# Patient Record
Sex: MALE | URBAN - NONMETROPOLITAN AREA
[De-identification: names, ages, dates, MRNs, and addresses within clinical notes are randomized per-mention and may not be internally consistent; named-entity substitution may affect disease eponyms.]

---

## 2021-10-05 ENCOUNTER — HOSPITAL ENCOUNTER (OUTPATIENT)
Age: 66
Setting detail: SPECIMEN
Discharge: HOME | End: 2021-10-05

## 2021-10-05 DIAGNOSIS — J96.00 ACUTE RESPIRATORY FAILURE, UNSPECIFIED WHETHER WITH HYPOXIA OR HYPERCAPNIA (HCC): ICD-10-CM

## 2021-10-05 LAB
ABSOLUTE EOS #: 0.14 K/UL (ref 0–0.44)
ABSOLUTE IMMATURE GRANULOCYTE: 0.04 K/UL (ref 0–0.3)
ABSOLUTE LYMPH #: 0.83 K/UL (ref 1.1–3.7)
ABSOLUTE MONO #: 0.29 K/UL (ref 0.1–1.2)
ALBUMIN SERPL-MCNC: 2.9 G/DL (ref 3.5–5.2)
ALBUMIN/GLOBULIN RATIO: 0.9 (ref 1–2.5)
ALP BLD-CCNC: 166 U/L (ref 40–129)
ALT SERPL-CCNC: <5 U/L (ref 5–41)
ANION GAP SERPL CALCULATED.3IONS-SCNC: 11 MMOL/L (ref 9–17)
AST SERPL-CCNC: 12 U/L
BASOPHILS # BLD: 0 % (ref 0–2)
BASOPHILS ABSOLUTE: 0 K/UL (ref 0–0.2)
BILIRUB SERPL-MCNC: 0.81 MG/DL (ref 0.3–1.2)
BUN BLDV-MCNC: 18 MG/DL (ref 8–23)
BUN/CREAT BLD: 19 (ref 9–20)
CALCIUM SERPL-MCNC: 8.4 MG/DL (ref 8.6–10.4)
CHLORIDE BLD-SCNC: 101 MMOL/L (ref 98–107)
CO2: 26 MMOL/L (ref 20–31)
CREAT SERPL-MCNC: 0.93 MG/DL (ref 0.7–1.2)
DIFFERENTIAL TYPE: ABNORMAL
EOSINOPHILS RELATIVE PERCENT: 4 % (ref 1–4)
GFR AFRICAN AMERICAN: >60 ML/MIN
GFR NON-AFRICAN AMERICAN: >60 ML/MIN
GFR SERPL CREATININE-BSD FRML MDRD: ABNORMAL ML/MIN/{1.73_M2}
GFR SERPL CREATININE-BSD FRML MDRD: ABNORMAL ML/MIN/{1.73_M2}
GLUCOSE BLD-MCNC: 81 MG/DL (ref 70–99)
HCT VFR BLD CALC: 27.8 % (ref 40.7–50.3)
HEMOGLOBIN: 7.9 G/DL (ref 13–17)
IMMATURE GRANULOCYTES: 1 %
LYMPHOCYTES # BLD: 23 % (ref 24–43)
MAGNESIUM: 1.6 MG/DL (ref 1.6–2.6)
MCH RBC QN AUTO: 25.3 PG (ref 25.2–33.5)
MCHC RBC AUTO-ENTMCNC: 28.4 G/DL (ref 28.4–34.8)
MCV RBC AUTO: 89.1 FL (ref 82.6–102.9)
MONOCYTES # BLD: 8 % (ref 3–12)
MORPHOLOGY: ABNORMAL
MORPHOLOGY: ABNORMAL
NRBC AUTOMATED: 0 PER 100 WBC
PDW BLD-RTO: 24.4 % (ref 11.8–14.4)
PLATELET # BLD: 199 K/UL (ref 138–453)
PLATELET ESTIMATE: ABNORMAL
PMV BLD AUTO: 10.5 FL (ref 8.1–13.5)
POTASSIUM SERPL-SCNC: 4.5 MMOL/L (ref 3.7–5.3)
RBC # BLD: 3.12 M/UL (ref 4.21–5.77)
RBC # BLD: ABNORMAL 10*6/UL
SEG NEUTROPHILS: 64 % (ref 36–65)
SEGMENTED NEUTROPHILS ABSOLUTE COUNT: 2.3 K/UL (ref 1.5–8.1)
SODIUM BLD-SCNC: 138 MMOL/L (ref 135–144)
TOTAL PROTEIN: 6.1 G/DL (ref 6.4–8.3)
WBC # BLD: 3.6 K/UL (ref 3.5–11.3)
WBC # BLD: ABNORMAL 10*3/UL

## 2021-10-05 PROCEDURE — 36415 COLL VENOUS BLD VENIPUNCTURE: CPT

## 2021-10-05 PROCEDURE — P9603 ONE-WAY ALLOW PRORATED MILES: HCPCS

## 2021-10-05 PROCEDURE — 85025 COMPLETE CBC W/AUTO DIFF WBC: CPT

## 2021-10-05 PROCEDURE — 80053 COMPREHEN METABOLIC PANEL: CPT

## 2021-10-05 PROCEDURE — 83735 ASSAY OF MAGNESIUM: CPT

## 2021-10-06 PROCEDURE — 99306 1ST NF CARE HIGH MDM 50: CPT | Performed by: FAMILY MEDICINE

## 2021-10-07 ENCOUNTER — OUTSIDE SERVICES (OUTPATIENT)
Dept: PRIMARY CARE CLINIC | Age: 66
End: 2021-10-07
Payer: MEDICARE

## 2021-10-07 DIAGNOSIS — J96.21 ACUTE ON CHRONIC RESPIRATORY FAILURE WITH HYPOXIA (HCC): Primary | ICD-10-CM

## 2021-10-07 DIAGNOSIS — I25.10 CORONARY ARTERY DISEASE INVOLVING NATIVE CORONARY ARTERY OF NATIVE HEART WITHOUT ANGINA PECTORIS: ICD-10-CM

## 2021-10-07 DIAGNOSIS — S81.802S OPEN WOUND OF LEFT LOWER LEG, SEQUELA: ICD-10-CM

## 2021-10-07 DIAGNOSIS — I73.9 PERIPHERAL VASCULAR DISEASE (HCC): ICD-10-CM

## 2021-10-07 PROBLEM — J96.00 ACUTE RESPIRATORY FAILURE (HCC): Status: ACTIVE | Noted: 2021-10-07

## 2021-10-07 RX ORDER — MAGNESIUM OXIDE 400 MG/1
400 TABLET ORAL DAILY
COMMUNITY
End: 2021-10-28 | Stop reason: SDUPTHER

## 2021-10-07 RX ORDER — FLUTICASONE PROPIONATE 50 MCG
1 SPRAY, SUSPENSION (ML) NASAL DAILY
COMMUNITY
End: 2021-10-28 | Stop reason: SDUPTHER

## 2021-10-07 RX ORDER — BUPROPION HYDROCHLORIDE 100 MG/1
100 TABLET ORAL DAILY
COMMUNITY
End: 2021-10-28 | Stop reason: SDUPTHER

## 2021-10-07 RX ORDER — ALBUTEROL SULFATE 1.25 MG/3ML
1 SOLUTION RESPIRATORY (INHALATION) EVERY 6 HOURS PRN
COMMUNITY
End: 2021-10-28 | Stop reason: SDUPTHER

## 2021-10-07 RX ORDER — METOPROLOL TARTRATE 50 MG/1
50 TABLET, FILM COATED ORAL DAILY
COMMUNITY
End: 2021-10-28 | Stop reason: SDUPTHER

## 2021-10-07 RX ORDER — QUETIAPINE FUMARATE 50 MG/1
50 TABLET, EXTENDED RELEASE ORAL NIGHTLY
COMMUNITY

## 2021-10-07 RX ORDER — FINASTERIDE 5 MG/1
5 TABLET, FILM COATED ORAL DAILY
COMMUNITY
End: 2021-10-28 | Stop reason: SDUPTHER

## 2021-10-07 RX ORDER — ACETAMINOPHEN 500 MG
500 TABLET ORAL EVERY 6 HOURS PRN
COMMUNITY

## 2021-10-07 RX ORDER — FUROSEMIDE 20 MG/1
20 TABLET ORAL DAILY
COMMUNITY
End: 2021-10-28 | Stop reason: SDUPTHER

## 2021-10-07 RX ORDER — NITROGLYCERIN 0.4 MG/1
0.4 TABLET SUBLINGUAL EVERY 5 MIN PRN
COMMUNITY
End: 2021-10-28 | Stop reason: SDUPTHER

## 2021-10-07 RX ORDER — WARFARIN SODIUM 2.5 MG/1
2.5 TABLET ORAL DAILY
COMMUNITY
End: 2021-10-28 | Stop reason: SDUPTHER

## 2021-10-07 RX ORDER — ALBUTEROL SULFATE 90 UG/1
2 AEROSOL, METERED RESPIRATORY (INHALATION) EVERY 4 HOURS PRN
COMMUNITY
End: 2021-10-28 | Stop reason: SDUPTHER

## 2021-10-07 RX ORDER — AMPICILLIN TRIHYDRATE 250 MG
1000 CAPSULE ORAL 2 TIMES DAILY
COMMUNITY

## 2021-10-07 RX ORDER — CLOPIDOGREL BISULFATE 75 MG/1
75 TABLET ORAL DAILY
COMMUNITY
End: 2021-10-28 | Stop reason: SDUPTHER

## 2021-10-07 RX ORDER — FERROUS SULFATE 325(65) MG
325 TABLET ORAL
COMMUNITY
End: 2021-10-28 | Stop reason: SDUPTHER

## 2021-10-07 RX ORDER — ROSUVASTATIN CALCIUM 5 MG/1
5 TABLET, COATED ORAL DAILY
COMMUNITY
End: 2021-10-28 | Stop reason: SDUPTHER

## 2021-10-07 RX ORDER — FLUTICASONE FUROATE, UMECLIDINIUM BROMIDE AND VILANTEROL TRIFENATATE 100; 62.5; 25 UG/1; UG/1; UG/1
1 POWDER RESPIRATORY (INHALATION) DAILY
COMMUNITY
End: 2021-10-28 | Stop reason: SDUPTHER

## 2021-10-07 RX ORDER — OXYCODONE AND ACETAMINOPHEN 10; 325 MG/1; MG/1
1 TABLET ORAL EVERY 4 HOURS PRN
COMMUNITY
End: 2021-10-28 | Stop reason: SDUPTHER

## 2021-10-07 RX ORDER — FOLIC ACID 1 MG/1
1 TABLET ORAL DAILY
COMMUNITY
End: 2021-10-28 | Stop reason: SDUPTHER

## 2021-10-07 RX ORDER — FAMOTIDINE 40 MG/1
40 TABLET, FILM COATED ORAL DAILY
COMMUNITY
End: 2021-10-28 | Stop reason: SDUPTHER

## 2021-10-07 RX ORDER — ALPRAZOLAM 0.5 MG/1
0.5 TABLET ORAL EVERY 8 HOURS PRN
COMMUNITY
End: 2021-10-28 | Stop reason: SDUPTHER

## 2021-10-07 NOTE — PROGRESS NOTES
Patient:  Isra Davis, 1955  I saw this patient on 10/06/2021, at [facility]. He had complicated hospital course, admitted for acute respiratory failure,had acute vascular insufficiency lt leg with compartmental syndrome and requires fasciotomy. Has severe PVD. He has lt leg pain and was discharged with wound vac but does not like it. Reason for Visit:      ICD-10-CM    1. Acute on chronic respiratory failure with hypoxia ([facility])  J96.21    2. Peripheral vascular disease (HCC)  I73.9    3. Open wound of left lower leg, sequela  S81.802S    4. Coronary artery disease involving native coronary artery of native heart without angina pectoris  I25.10        Changes since admissinoneon:    has lt leg pain  1. Fall:  none  2. Behavioral Change: does not like wound vac- states it makes his leg numb  3. Pain Control: has lt leg pain ,on pain meds  4. Mobility: decreased  5. Pressure Sore:  none  Current Outpatient Medications   Medication Sig Dispense Refill    acetaminophen (TYLENOL) 500 MG tablet Take 500 mg by mouth every 6 hours as needed for Pain      albuterol sulfate HFA (VENTOLIN HFA) 108 (90 Base) MCG/ACT inhaler Inhale 2 puffs into the lungs every 4 hours as needed for Wheezing      albuterol (ACCUNEB) 1.25 MG/3ML nebulizer solution Inhale 1 ampule into the lungs every 6 hours as needed for Wheezing      ALPRAZolam (XANAX) 0.5 MG tablet Take 0.5 mg by mouth every 8 hours as needed for Sleep.       buPROPion (WELLBUTRIN) 100 MG tablet Take 100 mg by mouth daily      Cinnamon 500 MG CAPS Take 1,000 mg by mouth 2 times daily      clopidogrel (PLAVIX) 75 MG tablet Take 75 mg by mouth daily      famotidine (PEPCID) 40 MG tablet Take 40 mg by mouth daily      ferrous sulfate (IRON 325) 325 (65 Fe) MG tablet Take 325 mg by mouth daily (with breakfast)      finasteride (PROSCAR) 5 MG tablet Take 5 mg by mouth daily      fluticasone (FLONASE) 50 MCG/ACT nasal spray 1 spray by Each Nostril route daily      folic acid (FOLVITE) 1 MG tablet Take 1 mg by mouth daily      furosemide (LASIX) 20 MG tablet Take 20 mg by mouth daily      magnesium oxide (MAG-OX) 400 MG tablet Take 400 mg by mouth daily      metoprolol tartrate (LOPRESSOR) 50 MG tablet Take 50 mg by mouth daily      nitroGLYCERIN (NITROSTAT) 0.4 MG SL tablet Place 0.4 mg under the tongue every 5 minutes as needed for Chest pain up to max of 3 total doses. If no relief after 1 dose, call 911.  oxyCODONE-acetaminophen (PERCOCET)  MG per tablet Take 1 tablet by mouth every 4 hours as needed for Pain.  QUEtiapine (SEROQUEL XR) 50 MG extended release tablet Take 50 mg by mouth nightly      rosuvastatin (CRESTOR) 5 MG tablet Take 5 mg by mouth daily      fluticasone-umeclidin-vilant (TRELEGY ELLIPTA) 100-62.5-25 MCG/INH AEPB Inhale 1 puff into the lungs daily      warfarin (COUMADIN) 4 MG tablet Take 4 mg by mouth daily       No current facility-administered medications for this visit.       Allergies:  Heparin and Hydralazine    Past Medical History:    Past Medical History:   Diagnosis Date    Acute respiratory failure ([facility] [address] 10/7/2021    Atherosclerosis of aorta (HCC)     Benign prostatic hyperplasia without lower urinary tract symptoms     Coronary artery disease involving native heart     Elevated prostate specific antigen (PSA)     GERD (gastroesophageal reflux disease)     Obstructive sleep apnea     Personal history of urinary calculi     Status post angioplasty with stent        Past Surgical History:    Past Surgical History:   Procedure Laterality Date    FASCIOTOMY      HX VASCULAR ANGIOPLASTY         Social History:   Social History     Tobacco Use    Smoking status: Current Every Day Smoker    Smokeless tobacco: Never Used   Substance Use Topics    Alcohol use: Not on file       Family History:   Family History   Problem Relation Age of Onset    Coronary Art Dis Mother            Review of Systems:  Constitutional: negative for fevers or chills  Eyes: negative for visual disturbance   ENT: negative for sore throat or nasal congestion,neg for  dysphagia  Respiratory:neg on oxygen by nasal cannula, neg for shortness of breath , cough  Cardiovascular: neg for chest pain , palpitations,pnd,neg for leg edema  Gastrointestinal: neg for abd pain, nausea, vomiting, diarrhea or constipation,no arelis,no blood in stool  Genitourinary: negative for dysuria, urgency,hematuria or frequency  Integument/breast: negative for skin rash or lesions  Neurological: negative for unilateral weakness, numbness or tingling. Muscular Skeletal: has lt leg pain from open wounds   Psych - anxious about leg pain    Objective:    Vitals:   BP-140/70,Pulse-68,Respi-18,Temp-97.6 WT-236lbs  -----------------------------------------------------------------  Exam:  GEN:   A & O x3, no apparent distress  EYES: No gross abnormalities. ENT:ENT exam normal, no neck nodes or sinus tenderness  NECK: normal, supple, no lymphadenopathy,  no carotid bruits  PULM: decreased breath sounds noted- bilat,no wheezing,on oxygen by nasal cannula  COR: regular rate & rhythm and no gallops  ABD:  soft, non-tender, non-distended, normal bowel sounds, no masses or organomegaly  : no cva or flank tenderness  EXT: Has open wounds lt leg,poor pulsations,no calf tenderness. NEURO: Motor and sensory grossly intact  PSYCH: anxious  SKIN:  No skin lesions or rashes    -----------------------------------------------------------------  Diagnostic Data:   · All diagnostic data was reviewed    Assessment:        ICD-10-CM    1. Acute on chronic respiratory failure with hypoxia (HCC)  J96.21    2. Peripheral vascular disease (HCC)  I73.9    3. Open wound of left lower leg, sequela  S81.802S    4.  Coronary artery disease involving native coronary artery of native heart without angina pectoris  I25.10      Patient Active Problem List   Diagnosis Code    Benign

## 2021-10-11 ENCOUNTER — OUTSIDE SERVICES (OUTPATIENT)
Dept: PRIMARY CARE CLINIC | Age: 66
End: 2021-10-11
Payer: MEDICARE

## 2021-10-11 DIAGNOSIS — J96.21 ACUTE ON CHRONIC RESPIRATORY FAILURE WITH HYPOXIA (HCC): Primary | ICD-10-CM

## 2021-10-11 DIAGNOSIS — I25.10 CORONARY ARTERY DISEASE INVOLVING NATIVE CORONARY ARTERY OF NATIVE HEART WITHOUT ANGINA PECTORIS: ICD-10-CM

## 2021-10-11 DIAGNOSIS — I73.9 PERIPHERAL VASCULAR DISEASE (HCC): ICD-10-CM

## 2021-10-11 DIAGNOSIS — S81.802S OPEN WOUND OF LEFT LOWER LEG, SEQUELA: ICD-10-CM

## 2021-10-11 PROCEDURE — 99308 SBSQ NF CARE LOW MDM 20: CPT | Performed by: FAMILY MEDICINE

## 2021-10-11 NOTE — PROGRESS NOTES
Patient:  Cece Kay, 1955  I saw this patient on 10/11/2021, at [facility]. Has leg pain  Has wound vac on wounds    Reason for Visit:      ICD-10-CM    1. Acute on chronic respiratory failure with hypoxia (HCC)  J96.21    2. Peripheral vascular disease (HCC)  I73.9    3. Open wound of left lower leg, sequela  S81.802S    4. Coronary artery disease involving native coronary artery of native heart without angina pectoris  I25.10        Changes since last visit:    has lt leg pain,keeping wound vac  1. Fall:  none  2. Behavioral Change: none  3. Pain Control: has lt leg pain ,on pain meds  4. Mobility: decreased  5. Pressure Sore:  none  Current Outpatient Medications   Medication Sig Dispense Refill    acetaminophen (TYLENOL) 500 MG tablet Take 500 mg by mouth every 6 hours as needed for Pain      albuterol sulfate HFA (VENTOLIN HFA) 108 (90 Base) MCG/ACT inhaler Inhale 2 puffs into the lungs every 4 hours as needed for Wheezing      albuterol (ACCUNEB) 1.25 MG/3ML nebulizer solution Inhale 1 ampule into the lungs every 6 hours as needed for Wheezing      ALPRAZolam (XANAX) 0.5 MG tablet Take 0.5 mg by mouth every 8 hours as needed for Sleep.       buPROPion (WELLBUTRIN) 100 MG tablet Take 100 mg by mouth daily      Cinnamon 500 MG CAPS Take 1,000 mg by mouth 2 times daily      clopidogrel (PLAVIX) 75 MG tablet Take 75 mg by mouth daily      famotidine (PEPCID) 40 MG tablet Take 40 mg by mouth daily      ferrous sulfate (IRON 325) 325 (65 Fe) MG tablet Take 325 mg by mouth daily (with breakfast)      finasteride (PROSCAR) 5 MG tablet Take 5 mg by mouth daily      fluticasone (FLONASE) 50 MCG/ACT nasal spray 1 spray by Each Nostril route daily      folic acid (FOLVITE) 1 MG tablet Take 1 mg by mouth daily      furosemide (LASIX) 20 MG tablet Take 20 mg by mouth daily      magnesium oxide (MAG-OX) 400 MG tablet Take 400 mg by mouth daily      metoprolol tartrate (LOPRESSOR) 50 MG tablet Take 50 mg by mouth daily      nitroGLYCERIN (NITROSTAT) 0.4 MG SL tablet Place 0.4 mg under the tongue every 5 minutes as needed for Chest pain up to max of 3 total doses. If no relief after 1 dose, call 911.  oxyCODONE-acetaminophen (PERCOCET)  MG per tablet Take 1 tablet by mouth every 4 hours as needed for Pain.  QUEtiapine (SEROQUEL XR) 50 MG extended release tablet Take 50 mg by mouth nightly      rosuvastatin (CRESTOR) 5 MG tablet Take 5 mg by mouth daily      fluticasone-umeclidin-vilant (TRELEGY ELLIPTA) 100-62.5-25 MCG/INH AEPB Inhale 1 puff into the lungs daily      warfarin (COUMADIN) 4 MG tablet Take 4 mg by mouth daily       No current facility-administered medications for this visit.       Allergies:  Heparin and Hydralazine    Past Medical History:    Past Medical History:   Diagnosis Date    Acute respiratory failure ([facility] [address] 10/7/2021    Atherosclerosis of aorta (HCC)     Benign prostatic hyperplasia without lower urinary tract symptoms     Coronary artery disease involving native heart     Elevated prostate specific antigen (PSA)     GERD (gastroesophageal reflux disease)     Obstructive sleep apnea     Personal history of urinary calculi     Status post angioplasty with stent        Past Surgical History:    Past Surgical History:   Procedure Laterality Date    FASCIOTOMY      HX VASCULAR ANGIOPLASTY         Social History:   Social History     Tobacco Use    Smoking status: Current Every Day Smoker    Smokeless tobacco: Never Used   Substance Use Topics    Alcohol use: Not on file       Family History:   Family History   Problem Relation Age of Onset    Coronary Art Dis Mother            Review of Systems:  Constitutional: negative for fevers or chills  Eyes: negative for visual disturbance   ENT: negative for sore throat or nasal congestion,neg for  dysphagia  Respiratory:neg on oxygen by nasal cannula, neg for shortness of breath , cough  Cardiovascular: neg for chest pain , palpitations,pnd,neg for leg edema  Gastrointestinal: neg for abd pain, nausea, vomiting, diarrhea or constipation,no arelis,no blood in stool  Genitourinary: negative for dysuria, urgency,hematuria or frequency  Integument/breast: negative for skin rash or lesions  Neurological: negative for unilateral weakness, numbness or tingling. Muscular Skeletal: has lt leg pain from open wounds   Psych - anxious about leg pain    Objective:    Vitals:   BP-138/68,Pulse-62,Respi-18,Temp-97.9 WT-236lbs  -----------------------------------------------------------------  Exam:  GEN:   A & O x3, no apparent distress  EYES: No gross abnormalities. ENT:ENT exam normal, no neck nodes or sinus tenderness  NECK: normal, supple, no lymphadenopathy,  no carotid bruits  PULM: decreased breath sounds noted- bilat,no wheezing,on oxygen by nasal cannula  COR: regular rate & rhythm and no gallops  ABD:  soft, non-tender, non-distended, normal bowel sounds, no masses or organomegaly  : no cva or flank tenderness  EXT: Has open wounds lt leg,poor pulsations,no calf tenderness. NEURO: Motor and sensory grossly intact  PSYCH: anxious  SKIN:  No skin lesions or rashes    -----------------------------------------------------------------  Diagnostic Data:   · All diagnostic data was reviewed    Assessment:        ICD-10-CM    1. Acute on chronic respiratory failure with hypoxia (HCC)  J96.21    2. Peripheral vascular disease (HCC)  I73.9    3. Open wound of left lower leg, sequela  S81.802S    4.  Coronary artery disease involving native coronary artery of native heart without angina pectoris  I25.10      Patient Active Problem List   Diagnosis Code    Benign prostatic hyperplasia without lower urinary tract symptoms N40.0    Obstructive sleep apnea G47.33    Coronary artery disease involving native heart I25.10    Atherosclerosis of aorta (HCC) I70.0    GERD (gastroesophageal reflux disease) K21.9    Status post

## 2021-10-12 ENCOUNTER — HOSPITAL ENCOUNTER (OUTPATIENT)
Age: 66
Setting detail: SPECIMEN
Discharge: HOME | End: 2021-10-12
Payer: MEDICARE

## 2021-10-12 LAB
ALBUMIN SERPL-MCNC: 3.1 G/DL (ref 3.5–5.2)
ALBUMIN/GLOBULIN RATIO: 1.1 (ref 1–2.5)
ALP BLD-CCNC: 149 U/L (ref 40–129)
ALT SERPL-CCNC: 6 U/L (ref 5–41)
ANION GAP SERPL CALCULATED.3IONS-SCNC: 11 MMOL/L (ref 9–17)
AST SERPL-CCNC: 15 U/L
BILIRUB SERPL-MCNC: 1.31 MG/DL (ref 0.3–1.2)
BUN BLDV-MCNC: 15 MG/DL (ref 8–23)
BUN/CREAT BLD: 16 (ref 9–20)
CALCIUM SERPL-MCNC: 8.4 MG/DL (ref 8.6–10.4)
CHLORIDE BLD-SCNC: 99 MMOL/L (ref 98–107)
CO2: 26 MMOL/L (ref 20–31)
CREAT SERPL-MCNC: 0.95 MG/DL (ref 0.7–1.2)
GFR AFRICAN AMERICAN: >60 ML/MIN
GFR NON-AFRICAN AMERICAN: >60 ML/MIN
GFR SERPL CREATININE-BSD FRML MDRD: ABNORMAL ML/MIN/{1.73_M2}
GFR SERPL CREATININE-BSD FRML MDRD: ABNORMAL ML/MIN/{1.73_M2}
GLUCOSE BLD-MCNC: 105 MG/DL (ref 70–99)
HCT VFR BLD CALC: 29.7 % (ref 40.7–50.3)
HEMOGLOBIN: 8.7 G/DL (ref 13–17)
MCH RBC QN AUTO: 26.9 PG (ref 25.2–33.5)
MCHC RBC AUTO-ENTMCNC: 29.3 G/DL (ref 28.4–34.8)
MCV RBC AUTO: 91.7 FL (ref 82.6–102.9)
NRBC AUTOMATED: 0 PER 100 WBC
PDW BLD-RTO: 25 % (ref 11.8–14.4)
PLATELET # BLD: 235 K/UL (ref 138–453)
PMV BLD AUTO: 11.1 FL (ref 8.1–13.5)
POTASSIUM SERPL-SCNC: 4.2 MMOL/L (ref 3.7–5.3)
RBC # BLD: 3.24 M/UL (ref 4.21–5.77)
SODIUM BLD-SCNC: 136 MMOL/L (ref 135–144)
TOTAL PROTEIN: 5.9 G/DL (ref 6.4–8.3)
WBC # BLD: 5.1 K/UL (ref 3.5–11.3)

## 2021-10-12 PROCEDURE — 85027 COMPLETE CBC AUTOMATED: CPT

## 2021-10-12 PROCEDURE — 80053 COMPREHEN METABOLIC PANEL: CPT

## 2021-10-12 PROCEDURE — 36415 COLL VENOUS BLD VENIPUNCTURE: CPT

## 2021-10-13 PROCEDURE — 99308 SBSQ NF CARE LOW MDM 20: CPT | Performed by: FAMILY MEDICINE

## 2021-10-14 ENCOUNTER — OUTSIDE SERVICES (OUTPATIENT)
Dept: PRIMARY CARE CLINIC | Age: 66
End: 2021-10-14
Payer: MEDICARE

## 2021-10-14 DIAGNOSIS — I25.10 CORONARY ARTERY DISEASE INVOLVING NATIVE CORONARY ARTERY OF NATIVE HEART WITHOUT ANGINA PECTORIS: ICD-10-CM

## 2021-10-14 DIAGNOSIS — I73.9 PERIPHERAL VASCULAR DISEASE (HCC): ICD-10-CM

## 2021-10-14 DIAGNOSIS — J96.21 ACUTE ON CHRONIC RESPIRATORY FAILURE WITH HYPOXIA (HCC): Primary | ICD-10-CM

## 2021-10-14 DIAGNOSIS — S81.802S OPEN WOUND OF LEFT LOWER LEG, SEQUELA: ICD-10-CM

## 2021-10-14 NOTE — PROGRESS NOTES
Patient:  Mica Santamaria, 1955  I saw this patient on 10/13/2021, at [facility]. Has allergy issues  Has leg pain  Has wound vac on wounds  Tolerating pt well  Reason for Visit:      ICD-10-CM    1. Acute on chronic respiratory failure with hypoxia (HCC)  J96.21    2. Peripheral vascular disease (HCC)  I73.9    3. Open wound of left lower leg, sequela  S81.802S    4. Coronary artery disease involving native coronary artery of native heart without angina pectoris  I25.10        Changes since last visit:   Tolerating pt well   has lt leg pain,keeping wound vac  1. Fall:  none  2. Behavioral Change: none  3. Pain Control: has lt leg pain ,on pain meds  4. Mobility: improving  5. Pressure Sore:  none  Current Outpatient Medications   Medication Sig Dispense Refill    acetaminophen (TYLENOL) 500 MG tablet Take 500 mg by mouth every 6 hours as needed for Pain      albuterol sulfate HFA (VENTOLIN HFA) 108 (90 Base) MCG/ACT inhaler Inhale 2 puffs into the lungs every 4 hours as needed for Wheezing      albuterol (ACCUNEB) 1.25 MG/3ML nebulizer solution Inhale 1 ampule into the lungs every 6 hours as needed for Wheezing      ALPRAZolam (XANAX) 0.5 MG tablet Take 0.5 mg by mouth every 8 hours as needed for Sleep.       buPROPion (WELLBUTRIN) 100 MG tablet Take 100 mg by mouth daily      Cinnamon 500 MG CAPS Take 1,000 mg by mouth 2 times daily      clopidogrel (PLAVIX) 75 MG tablet Take 75 mg by mouth daily      famotidine (PEPCID) 40 MG tablet Take 40 mg by mouth daily      ferrous sulfate (IRON 325) 325 (65 Fe) MG tablet Take 325 mg by mouth daily (with breakfast)      finasteride (PROSCAR) 5 MG tablet Take 5 mg by mouth daily      fluticasone (FLONASE) 50 MCG/ACT nasal spray 1 spray by Each Nostril route daily      folic acid (FOLVITE) 1 MG tablet Take 1 mg by mouth daily      furosemide (LASIX) 20 MG tablet Take 20 mg by mouth daily      magnesium oxide (MAG-OX) 400 MG tablet Take 400 mg by mouth daily      metoprolol tartrate (LOPRESSOR) 50 MG tablet Take 50 mg by mouth daily      nitroGLYCERIN (NITROSTAT) 0.4 MG SL tablet Place 0.4 mg under the tongue every 5 minutes as needed for Chest pain up to max of 3 total doses. If no relief after 1 dose, call 911.  oxyCODONE-acetaminophen (PERCOCET)  MG per tablet Take 1 tablet by mouth every 4 hours as needed for Pain.  QUEtiapine (SEROQUEL XR) 50 MG extended release tablet Take 50 mg by mouth nightly      rosuvastatin (CRESTOR) 5 MG tablet Take 5 mg by mouth daily      fluticasone-umeclidin-vilant (TRELEGY ELLIPTA) 100-62.5-25 MCG/INH AEPB Inhale 1 puff into the lungs daily      warfarin (COUMADIN) 4 MG tablet Take 4 mg by mouth daily       No current facility-administered medications for this visit.       Allergies:  Heparin and Hydralazine    Past Medical History:    Past Medical History:   Diagnosis Date    Acute respiratory failure ([facility] [address] 10/7/2021    Atherosclerosis of aorta (HCC)     Benign prostatic hyperplasia without lower urinary tract symptoms     Coronary artery disease involving native heart     Elevated prostate specific antigen (PSA)     GERD (gastroesophageal reflux disease)     Obstructive sleep apnea     Personal history of urinary calculi     Status post angioplasty with stent        Past Surgical History:    Past Surgical History:   Procedure Laterality Date    FASCIOTOMY      HX VASCULAR ANGIOPLASTY         Social History:   Social History     Tobacco Use    Smoking status: Current Every Day Smoker    Smokeless tobacco: Never Used   Substance Use Topics    Alcohol use: Not on file       Family History:   Family History   Problem Relation Age of Onset    Coronary Art Dis Mother            Review of Systems:  Constitutional: negative for fevers or chills  Eyes: negative for visual disturbance   ENT: negative for sore throat or nasal congestion,neg for  dysphagia  Respiratory:neg on oxygen by nasal cannula, neg for shortness of breath , cough  Cardiovascular: neg for chest pain , palpitations,pnd,neg for leg edema  Gastrointestinal: neg for abd pain, nausea, vomiting, diarrhea or constipation,no arelis,no blood in stool  Genitourinary: negative for dysuria, urgency,hematuria or frequency  Integument/breast: negative for skin rash or lesions  Neurological: negative for unilateral weakness, numbness or tingling. Muscular Skeletal: has lt leg pain from open wounds   Psych - anxious about leg pain    Objective:    Vitals:   BP-131/69,Pulse-67,Respi-18,Temp-97.6 WT-236lbs  -----------------------------------------------------------------  Exam:  GEN:   A & O x3, no apparent distress  EYES: No gross abnormalities. ENT:ENT exam normal, no neck nodes or sinus tenderness  NECK: normal, supple, no lymphadenopathy,  no carotid bruits  PULM: decreased breath sounds noted- bilat,no wheezing,on oxygen by nasal cannula  COR: regular rate & rhythm and no gallops  ABD:  soft, non-tender, non-distended, normal bowel sounds, no masses or organomegaly  : no cva or flank tenderness  EXT: Has open wounds lt leg,poor pulsations,no calf tenderness. NEURO: Motor and sensory grossly intact  PSYCH: anxious  SKIN:  No skin lesions or rashes    -----------------------------------------------------------------  Diagnostic Data:   · All diagnostic data was reviewed    Assessment:        ICD-10-CM    1. Acute on chronic respiratory failure with hypoxia (HCC)  J96.21    2. Peripheral vascular disease (HCC)  I73.9    3. Open wound of left lower leg, sequela  S81.068E    4.  Coronary artery disease involving native coronary artery of native heart without angina pectoris  I25.10      Patient Active Problem List   Diagnosis Code    Benign prostatic hyperplasia without lower urinary tract symptoms N40.0    Obstructive sleep apnea G47.33    Coronary artery disease involving native heart I25.10    Atherosclerosis of aorta (HCC) I70.0    GERD (gastroesophageal reflux disease) K21.9    Status post angioplasty with stent Z95.820    Acute respiratory failure (HCC) J96.00         Plan:     Pt and ot  Pain control  Continue wound vac  Oxygen by nasal cannula  Monitor labs-Hb and liver functions improving  Continue current medications  Prevent pressure sore  Prevent falls  Citlali Dopp was evaluated today and a DME order was entered for a standard wheelchair because he requires this to successfully complete daily living tasks of ambulating. A standard manual wheelchair is necessary due to patient's impaired ambulation and mobility restrictions and would be unable to resolve these daily living tasks using a cane or walker. The patient is capable of using a standard wheelchair safely in their home and can maneuver within their home with adequate access. There is a caregiver available to provide necessary assistance. The need for this equipment was discussed with the patient and he understands, is in agreement, and has not expressed an unwillingness to use the wheelchair.       Electronically signed by Dayna Cristina MD on 10/15/2021 at 7:57 AM

## 2021-10-25 ENCOUNTER — OUTSIDE SERVICES (OUTPATIENT)
Dept: PRIMARY CARE CLINIC | Age: 66
End: 2021-10-25
Payer: MEDICARE

## 2021-10-25 DIAGNOSIS — J96.21 ACUTE ON CHRONIC RESPIRATORY FAILURE WITH HYPOXIA (HCC): Primary | ICD-10-CM

## 2021-10-25 DIAGNOSIS — I25.10 CORONARY ARTERY DISEASE INVOLVING NATIVE CORONARY ARTERY OF NATIVE HEART WITHOUT ANGINA PECTORIS: ICD-10-CM

## 2021-10-25 DIAGNOSIS — S81.802S OPEN WOUND OF LEFT LOWER LEG, SEQUELA: ICD-10-CM

## 2021-10-25 DIAGNOSIS — I73.9 PERIPHERAL VASCULAR DISEASE (HCC): ICD-10-CM

## 2021-10-25 DIAGNOSIS — U07.1 COVID-19 VIRUS INFECTION: ICD-10-CM

## 2021-10-25 PROCEDURE — 99309 SBSQ NF CARE MODERATE MDM 30: CPT | Performed by: FAMILY MEDICINE

## 2021-10-25 RX ORDER — ACETAMINOPHEN 160 MG
TABLET,DISINTEGRATING ORAL
COMMUNITY
End: 2021-10-28 | Stop reason: SDUPTHER

## 2021-10-25 NOTE — PROGRESS NOTES
Patient:  Norma Lujan, 1955  I saw this patient on 10/25/2021, at [facility]. Has covid infection  He has been vaccinated  Has cough and on oxygen   He feels same and denies any worsening  Has leg pain  Has wound vac on wounds  Tolerating pt well  Reason for Visit:      ICD-10-CM    1. Acute on chronic respiratory failure with hypoxia (HCC)  J96.21    2. Peripheral vascular disease (HCC)  I73.9    3. Open wound of left lower leg, sequela  S81.802S    4. Coronary artery disease involving native coronary artery of native heart without angina pectoris  I25.10    5. COVID-19 virus infection  U07.1        Changes since last visit:   Has covid infection   has lt leg pain,keeping wound vac  1. Fall:  none  2. Behavioral Change: none  3. Pain Control: has lt leg pain ,on pain meds  4. Mobility: improving  5. Pressure Sore:  none  Current Outpatient Medications   Medication Sig Dispense Refill    Dexamethasone (DECADRON PO) Take 10 mg by mouth      Cholecalciferol (VITAMIN D3) 50 MCG (2000 UT) CAPS Take by mouth      acetaminophen (TYLENOL) 500 MG tablet Take 500 mg by mouth every 6 hours as needed for Pain      albuterol sulfate HFA (VENTOLIN HFA) 108 (90 Base) MCG/ACT inhaler Inhale 2 puffs into the lungs every 4 hours as needed for Wheezing      albuterol (ACCUNEB) 1.25 MG/3ML nebulizer solution Inhale 1 ampule into the lungs every 6 hours as needed for Wheezing      ALPRAZolam (XANAX) 0.5 MG tablet Take 0.5 mg by mouth every 8 hours as needed for Sleep.       buPROPion (WELLBUTRIN) 100 MG tablet Take 100 mg by mouth daily      Cinnamon 500 MG CAPS Take 1,000 mg by mouth 2 times daily      clopidogrel (PLAVIX) 75 MG tablet Take 75 mg by mouth daily      famotidine (PEPCID) 40 MG tablet Take 40 mg by mouth daily      ferrous sulfate (IRON 325) 325 (65 Fe) MG tablet Take 325 mg by mouth daily (with breakfast)      finasteride (PROSCAR) 5 MG tablet Take 5 mg by mouth daily      fluticasone (FLONASE) 50 MCG/ACT nasal spray 1 spray by Each Nostril route daily      folic acid (FOLVITE) 1 MG tablet Take 1 mg by mouth daily      furosemide (LASIX) 20 MG tablet Take 20 mg by mouth daily      magnesium oxide (MAG-OX) 400 MG tablet Take 400 mg by mouth daily      metoprolol tartrate (LOPRESSOR) 50 MG tablet Take 50 mg by mouth daily      nitroGLYCERIN (NITROSTAT) 0.4 MG SL tablet Place 0.4 mg under the tongue every 5 minutes as needed for Chest pain up to max of 3 total doses. If no relief after 1 dose, call 911.  oxyCODONE-acetaminophen (PERCOCET)  MG per tablet Take 1 tablet by mouth every 4 hours as needed for Pain.  QUEtiapine (SEROQUEL XR) 50 MG extended release tablet Take 50 mg by mouth nightly      rosuvastatin (CRESTOR) 5 MG tablet Take 5 mg by mouth daily      fluticasone-umeclidin-vilant (TRELEGY ELLIPTA) 100-62.5-25 MCG/INH AEPB Inhale 1 puff into the lungs daily      warfarin (COUMADIN) 4 MG tablet Take 4 mg by mouth daily       No current facility-administered medications for this visit.       Allergies:  Heparin and Hydralazine    Past Medical History:    Past Medical History:   Diagnosis Date    Acute respiratory failure ([address] 10/7/2021    Atherosclerosis of aorta (HCC)     Benign prostatic hyperplasia without lower urinary tract symptoms     Coronary artery disease involving native heart     Elevated prostate specific antigen (PSA)     GERD (gastroesophageal reflux disease)     Obstructive sleep apnea     Personal history of urinary calculi     Status post angioplasty with stent        Past Surgical History:    Past Surgical History:   Procedure Laterality Date    FASCIOTOMY      HX VASCULAR ANGIOPLASTY         Social History:   Social History     Tobacco Use    Smoking status: Current Every Day Smoker    Smokeless tobacco: Never Used   Substance Use Topics    Alcohol use: Not on file       Family History:   Family History   Problem Relation Age of Onset  Coronary Art Dis Mother            Review of Systems:  Constitutional: negative for fevers or chills  Eyes: negative for visual disturbance   ENT: negative for sore throat or nasal congestion,neg for  dysphagia  Respiratory:neg on 3 lit  oxygen by nasal cannula, neg for shortness of breath at rest ,has  cough  Cardiovascular: neg for chest pain , palpitations,pnd,neg for leg edema  Gastrointestinal: neg for abd pain, nausea, vomiting, diarrhea or constipation,no arelis,no blood in stool  Genitourinary: negative for dysuria, urgency,hematuria or frequency  Integument/breast: negative for skin rash or lesions  Neurological: negative for unilateral weakness, numbness or tingling. Muscular Skeletal: has lt leg pain from open wounds   Psych - anxious about leg pain    Objective:    Vitals:   BP-148/82,Pulse-89,Respi-20,Temp-98.4   -----------------------------------------------------------------  Exam:  GEN:   A & O x3, no apparent distress  EYES: No gross abnormalities. ENT:ENT exam normal, no neck nodes or sinus tenderness  NECK: normal, supple, no lymphadenopathy,  no carotid bruits  PULM: decreased breath sounds noted- bilat,no wheezing,on 3 lit oxygen by nasal cannula  COR: regular rate & rhythm and no gallops  ABD:  soft, non-tender, non-distended, normal bowel sounds, no masses or organomegaly  : no cva or flank tenderness  EXT: Has open wounds lt leg,poor pulsations,no calf tenderness. NEURO: Motor and sensory grossly intact  PSYCH: anxious  SKIN:  No skin lesions or rashes    -----------------------------------------------------------------  Diagnostic Data:   · All diagnostic data was reviewed    Assessment:        ICD-10-CM    1. Acute on chronic respiratory failure with hypoxia (HCC)  J96.21    2. Peripheral vascular disease (HCC)  I73.9    3. Open wound of left lower leg, sequela  S81.802S    4.  Coronary artery disease involving native coronary artery of native heart without angina pectoris  I25.10 5. COVID-19 virus infection  U07.1      Patient Active Problem List   Diagnosis Code    Benign prostatic hyperplasia without lower urinary tract symptoms N40.0    Obstructive sleep apnea G47.33    Coronary artery disease involving native heart I25.10    Atherosclerosis of aorta (HCC) I70.0    GERD (gastroesophageal reflux disease) K21.9    Status post angioplasty with stent Z95.820    Acute respiratory failure (HCC) J96.00         Plan:     Pt and ot  Pain control  Continue wound vac  Oxygen by nasal cannula  Monitor labs-Hb and liver functions improving  Continue current medications  Prevent pressure sore  Prevent falls    Discussed his covid infection and benefit with regeneron antibody but declines. He does not wish to go back to hospital either.   Add decadron,vit d and zinc    Electronically signed by David Yousif MD on 10/25/2021 at 8:16 PM

## 2021-10-27 ENCOUNTER — OUTSIDE SERVICES (OUTPATIENT)
Dept: PRIMARY CARE CLINIC | Age: 66
End: 2021-10-27
Payer: MEDICARE

## 2021-10-27 DIAGNOSIS — J96.21 ACUTE ON CHRONIC RESPIRATORY FAILURE WITH HYPOXIA (HCC): Primary | ICD-10-CM

## 2021-10-27 DIAGNOSIS — S81.802S OPEN WOUND OF LEFT LOWER LEG, SEQUELA: ICD-10-CM

## 2021-10-27 DIAGNOSIS — I73.9 PERIPHERAL VASCULAR DISEASE (HCC): ICD-10-CM

## 2021-10-27 DIAGNOSIS — U07.1 COVID-19 VIRUS INFECTION: ICD-10-CM

## 2021-10-27 PROCEDURE — 99308 SBSQ NF CARE LOW MDM 20: CPT | Performed by: FAMILY MEDICINE

## 2021-10-27 NOTE — PROGRESS NOTES
Patient:  Miracle Merrill, 1955  I saw this patient on 10/27/2021, at [facility]. Has covid infection  He has been vaccinated  Has cough and on oxygen   He feels same and denies any worsening  Has leg pain  Has wound vac on wounds  Wants to go home  Reason for Visit:      ICD-10-CM    1. Acute on chronic respiratory failure with hypoxia (HCC)  J96.21    2. Peripheral vascular disease (HCC)  I73.9    3. Open wound of left lower leg, sequela  S81.802S    4. COVID-19 virus infection  U07.1        Changes since last visit:   Has covid infection   has lt leg pain,keeping wound vac  1. Fall:  none  2. Behavioral Change: none  3. Pain Control: has lt leg pain ,on pain meds  4. Mobility: improving  5.  Pressure Sore:  none  Current Outpatient Medications   Medication Sig Dispense Refill    warfarin (COUMADIN) 2.5 MG tablet Take 1 tablet by mouth daily 30 tablet 0    albuterol (ACCUNEB) 1.25 MG/3ML nebulizer solution Inhale 3 mLs into the lungs every 6 hours as needed for Wheezing 360 mL 0    albuterol sulfate HFA (VENTOLIN HFA) 108 (90 Base) MCG/ACT inhaler Inhale 2 puffs into the lungs every 4 hours as needed for Wheezing 18 g 0    Cholecalciferol (VITAMIN D3) 50 MCG (2000 UT) CAPS Take 1 capsule by mouth daily 30 capsule 0    buPROPion (WELLBUTRIN) 100 MG tablet Take 1 tablet by mouth daily 30 tablet 0    folic acid (FOLVITE) 1 MG tablet Take 1 tablet by mouth daily 30 tablet 0    famotidine (PEPCID) 40 MG tablet Take 1 tablet by mouth daily 30 tablet 0    magnesium oxide (MAG-OX) 400 MG tablet Take 1 tablet by mouth daily 30 tablet 0    clopidogrel (PLAVIX) 75 MG tablet Take 1 tablet by mouth daily 30 tablet 0    metoprolol tartrate (LOPRESSOR) 50 MG tablet Take 1 tablet by mouth daily 30 tablet 0    rosuvastatin (CRESTOR) 5 MG tablet Take 1 tablet by mouth daily 30 tablet 0    fluticasone-umeclidin-vilant (TRELEGY ELLIPTA) 100-62.5-25 MCG/INH AEPB Inhale 1 puff into the lungs daily 1 each 2  finasteride (PROSCAR) 5 MG tablet Take 1 tablet by mouth daily 30 tablet 0    fluticasone (FLONASE) 50 MCG/ACT nasal spray 1 spray by Each Nostril route daily 1 each 0    ferrous sulfate (IRON 325) 325 (65 Fe) MG tablet Take 1 tablet by mouth daily (with breakfast) 30 tablet 0    nitroGLYCERIN (NITROSTAT) 0.4 MG SL tablet Place 1 tablet under the tongue every 5 minutes as needed for Chest pain up to max of 3 total doses. If no relief after 1 dose, call 911. 25 tablet 0    furosemide (LASIX) 20 MG tablet Take 1 tablet by mouth daily 30 tablet 0    oxyCODONE-acetaminophen (PERCOCET)  MG per tablet Take 1 tablet by mouth every 4 hours as needed for Pain for up to 30 days. 28 tablet 0    ALPRAZolam (XANAX) 0.5 MG tablet Take 1 tablet by mouth every 8 hours as needed for Sleep for up to 30 days. 30 tablet 0    Zinc 100 MG TABS Take 100 mg by mouth daily 30 tablet 0    loratadine (CLARITIN) 10 MG tablet Take 1 tablet by mouth daily 30 tablet 0    nystatin (MYCOSTATIN) 355036 UNIT/GM powder Apply topically 4 times daily Apply topically 4 times daily. 1 each 0    dexamethasone (DECADRON) 6 MG tablet Take 1.5 tablets daily for 7 days 10 tablet 0    acetaminophen (TYLENOL) 500 MG tablet Take 500 mg by mouth every 6 hours as needed for Pain      Cinnamon 500 MG CAPS Take 1,000 mg by mouth 2 times daily      QUEtiapine (SEROQUEL XR) 50 MG extended release tablet Take 50 mg by mouth nightly       No current facility-administered medications for this visit.       Allergies:  Heparin and Hydralazine    Past Medical History:    Past Medical History:   Diagnosis Date    Acute respiratory failure ([facility] [address] 10/7/2021    Atherosclerosis of aorta (HCC)     Benign prostatic hyperplasia without lower urinary tract symptoms     Coronary artery disease involving native heart     Elevated prostate specific antigen (PSA)     GERD (gastroesophageal reflux disease)     Obstructive sleep apnea     Personal history of urinary calculi     Status post angioplasty with stent        Past Surgical History:    Past Surgical History:   Procedure Laterality Date    FASCIOTOMY      HX VASCULAR ANGIOPLASTY         Social History:   Social History     Tobacco Use    Smoking status: Current Every Day Smoker    Smokeless tobacco: Never Used   Substance Use Topics    Alcohol use: Not on file       Family History:   Family History   Problem Relation Age of Onset    Coronary Art Dis Mother            Review of Systems:  Constitutional: negative for fevers or chills  Eyes: negative for visual disturbance   ENT: negative for sore throat or nasal congestion,neg for  dysphagia  Respiratory:neg on 3 lit  oxygen by nasal cannula, neg for shortness of breath at rest ,has  cough  Cardiovascular: neg for chest pain , palpitations,pnd,neg for leg edema  Gastrointestinal: neg for abd pain, nausea, vomiting, diarrhea or constipation,no arelis,no blood in stool  Genitourinary: negative for dysuria, urgency,hematuria or frequency  Integument/breast: negative for skin rash or lesions  Neurological: negative for unilateral weakness, numbness or tingling. Muscular Skeletal: has lt leg pain from open wounds   Psych - anxious about leg pain    Objective:    Vitals:   BP-122/74,Pulse-89,Respi-20,Temp-98.1   -----------------------------------------------------------------  Exam:  GEN:   A & O x3, no apparent distress  EYES: No gross abnormalities. ENT:ENT exam normal, no neck nodes or sinus tenderness  NECK: normal, supple, no lymphadenopathy,  no carotid bruits  PULM: decreased breath sounds noted- bilat,no wheezing,on 3 lit oxygen by nasal cannula  COR: regular rate & rhythm and no gallops  ABD:  soft, non-tender, non-distended, normal bowel sounds, no masses or organomegaly  : no cva or flank tenderness  EXT: Has open wounds lt leg,poor pulsations,no calf tenderness.    NEURO: Motor and sensory grossly intact  PSYCH: anxious  SKIN:  No skin lesions or goldy    -----------------------------------------------------------------  Diagnostic Data:   · All diagnostic data was reviewed    Assessment:        ICD-10-CM    1. Acute on chronic respiratory failure with hypoxia ([facility])  J96.21    2. Peripheral vascular disease ([facility])  I73.9    3. Open wound of left lower leg, sequela  S81.802S    4.  COVID-19 virus infection  U07.1      Patient Active Problem List   Diagnosis Code    Benign prostatic hyperplasia without lower urinary tract symptoms N40.0    Obstructive sleep apnea G47.33    Coronary artery disease involving native heart I25.10    Atherosclerosis of aorta ([facility]) I70.0    GERD (gastroesophageal reflux disease) K21.9    Status post angioplasty with stent Z95.820    Acute respiratory failure ([facility] [address] J96.00         Plan:     Pt and ot  Pain control  Continue wound vac  Oxygen by nasal cannula  Continue current medications on discharge      F/u with pcp in 1 wk and coumadin clinic next wk  Electronically signed by Rodolfo Wallace MD on 10/28/2021 at 4:01 PM

## 2021-10-28 DIAGNOSIS — M79.605 ACUTE LEG PAIN, LEFT: Primary | ICD-10-CM

## 2021-10-28 RX ORDER — NITROGLYCERIN 0.4 MG/1
0.4 TABLET SUBLINGUAL EVERY 5 MIN PRN
Qty: 25 TABLET | Refills: 0 | Status: SHIPPED | OUTPATIENT
Start: 2021-10-28

## 2021-10-28 RX ORDER — FERROUS SULFATE 325(65) MG
325 TABLET ORAL
Qty: 30 TABLET | Refills: 0 | Status: SHIPPED | OUTPATIENT
Start: 2021-10-28

## 2021-10-28 RX ORDER — FAMOTIDINE 40 MG/1
40 TABLET, FILM COATED ORAL DAILY
Qty: 30 TABLET | Refills: 0 | Status: SHIPPED | OUTPATIENT
Start: 2021-10-28

## 2021-10-28 RX ORDER — CLOPIDOGREL BISULFATE 75 MG/1
75 TABLET ORAL DAILY
Qty: 30 TABLET | Refills: 0 | Status: SHIPPED | OUTPATIENT
Start: 2021-10-28

## 2021-10-28 RX ORDER — FLUTICASONE FUROATE, UMECLIDINIUM BROMIDE AND VILANTEROL TRIFENATATE 100; 62.5; 25 UG/1; UG/1; UG/1
1 POWDER RESPIRATORY (INHALATION) DAILY
Qty: 1 EACH | Refills: 2 | Status: SHIPPED | OUTPATIENT
Start: 2021-10-28

## 2021-10-28 RX ORDER — METOPROLOL TARTRATE 50 MG/1
50 TABLET, FILM COATED ORAL DAILY
Qty: 30 TABLET | Refills: 0 | Status: SHIPPED | OUTPATIENT
Start: 2021-10-28

## 2021-10-28 RX ORDER — FOLIC ACID 1 MG/1
1 TABLET ORAL DAILY
Qty: 30 TABLET | Refills: 0 | Status: SHIPPED | OUTPATIENT
Start: 2021-10-28

## 2021-10-28 RX ORDER — LORATADINE 10 MG/1
10 TABLET ORAL DAILY
COMMUNITY
End: 2021-10-28 | Stop reason: SDUPTHER

## 2021-10-28 RX ORDER — ALBUTEROL SULFATE 1.25 MG/3ML
1 SOLUTION RESPIRATORY (INHALATION) EVERY 6 HOURS PRN
Qty: 360 ML | Refills: 0 | Status: SHIPPED | OUTPATIENT
Start: 2021-10-28

## 2021-10-28 RX ORDER — B-COMPLEX WITH VITAMIN C
100 TABLET ORAL DAILY
Qty: 30 TABLET | Refills: 0 | Status: SHIPPED | OUTPATIENT
Start: 2021-10-28

## 2021-10-28 RX ORDER — NYSTATIN 100000 [USP'U]/G
POWDER TOPICAL 4 TIMES DAILY
Qty: 1 EACH | Refills: 0 | Status: SHIPPED | OUTPATIENT
Start: 2021-10-28

## 2021-10-28 RX ORDER — LORATADINE 10 MG/1
10 TABLET ORAL DAILY
Qty: 30 TABLET | Refills: 0 | Status: SHIPPED | OUTPATIENT
Start: 2021-10-28

## 2021-10-28 RX ORDER — WARFARIN SODIUM 2.5 MG/1
2.5 TABLET ORAL DAILY
Qty: 30 TABLET | Refills: 0 | Status: SHIPPED | OUTPATIENT
Start: 2021-10-28

## 2021-10-28 RX ORDER — ROSUVASTATIN CALCIUM 5 MG/1
5 TABLET, COATED ORAL DAILY
Qty: 30 TABLET | Refills: 0 | Status: SHIPPED | OUTPATIENT
Start: 2021-10-28

## 2021-10-28 RX ORDER — ACETAMINOPHEN 160 MG
1 TABLET,DISINTEGRATING ORAL DAILY
Qty: 30 CAPSULE | Refills: 0 | Status: SHIPPED | OUTPATIENT
Start: 2021-10-28

## 2021-10-28 RX ORDER — ALPRAZOLAM 0.5 MG/1
0.5 TABLET ORAL EVERY 8 HOURS PRN
Qty: 30 TABLET | Refills: 0 | Status: SHIPPED | OUTPATIENT
Start: 2021-10-28 | End: 2021-11-27

## 2021-10-28 RX ORDER — MAGNESIUM OXIDE 400 MG/1
400 TABLET ORAL DAILY
Qty: 30 TABLET | Refills: 0 | Status: SHIPPED | OUTPATIENT
Start: 2021-10-28

## 2021-10-28 RX ORDER — NYSTATIN 100000 [USP'U]/G
POWDER TOPICAL 4 TIMES DAILY
COMMUNITY
End: 2021-10-28 | Stop reason: SDUPTHER

## 2021-10-28 RX ORDER — OXYCODONE AND ACETAMINOPHEN 10; 325 MG/1; MG/1
1 TABLET ORAL EVERY 4 HOURS PRN
Qty: 28 TABLET | Refills: 0 | Status: SHIPPED | OUTPATIENT
Start: 2021-10-28 | End: 2021-11-27

## 2021-10-28 RX ORDER — ALBUTEROL SULFATE 90 UG/1
2 AEROSOL, METERED RESPIRATORY (INHALATION) EVERY 4 HOURS PRN
Qty: 18 G | Refills: 0 | Status: SHIPPED | OUTPATIENT
Start: 2021-10-28

## 2021-10-28 RX ORDER — B-COMPLEX WITH VITAMIN C
TABLET ORAL
COMMUNITY
End: 2021-10-28 | Stop reason: SDUPTHER

## 2021-10-28 RX ORDER — DEXAMETHASONE 6 MG/1
TABLET ORAL
Qty: 10 TABLET | Refills: 0 | Status: SHIPPED | OUTPATIENT
Start: 2021-10-28

## 2021-10-28 RX ORDER — FUROSEMIDE 20 MG/1
20 TABLET ORAL DAILY
Qty: 30 TABLET | Refills: 0 | Status: SHIPPED | OUTPATIENT
Start: 2021-10-28

## 2021-10-28 RX ORDER — BUPROPION HYDROCHLORIDE 100 MG/1
100 TABLET ORAL DAILY
Qty: 30 TABLET | Refills: 0 | Status: SHIPPED | OUTPATIENT
Start: 2021-10-28

## 2021-10-28 RX ORDER — FINASTERIDE 5 MG/1
5 TABLET, FILM COATED ORAL DAILY
Qty: 30 TABLET | Refills: 0 | Status: SHIPPED | OUTPATIENT
Start: 2021-10-28

## 2021-10-28 RX ORDER — FLUTICASONE PROPIONATE 50 MCG
1 SPRAY, SUSPENSION (ML) NASAL DAILY
Qty: 1 EACH | Refills: 0 | Status: SHIPPED | OUTPATIENT
Start: 2021-10-28